# Patient Record
Sex: FEMALE | Race: WHITE | Employment: UNEMPLOYED | URBAN - METROPOLITAN AREA
[De-identification: names, ages, dates, MRNs, and addresses within clinical notes are randomized per-mention and may not be internally consistent; named-entity substitution may affect disease eponyms.]

---

## 2017-05-05 PROBLEM — M85.88 OSTEOPENIA OF SPINE: Status: ACTIVE | Noted: 2017-05-05

## 2018-01-07 ENCOUNTER — HOSPITAL ENCOUNTER (OUTPATIENT)
Age: 58
Discharge: HOME | End: 2018-01-07
Payer: COMMERCIAL

## 2018-01-07 ENCOUNTER — APPOINTMENT (OUTPATIENT)
Dept: GENERAL RADIOLOGY | Age: 58
End: 2018-01-07
Attending: PHYSICIAN ASSISTANT
Payer: COMMERCIAL

## 2018-01-07 VITALS
OXYGEN SATURATION: 100 % | TEMPERATURE: 98 F | WEIGHT: 145 LBS | HEIGHT: 67.5 IN | RESPIRATION RATE: 16 BRPM | DIASTOLIC BLOOD PRESSURE: 91 MMHG | BODY MASS INDEX: 22.49 KG/M2 | HEART RATE: 79 BPM | SYSTOLIC BLOOD PRESSURE: 134 MMHG

## 2018-01-07 DIAGNOSIS — M20.012 MALLET DEFORMITY OF LEFT RING FINGER: Primary | ICD-10-CM

## 2018-01-07 PROCEDURE — 99213 OFFICE O/P EST LOW 20 MIN: CPT

## 2018-01-07 PROCEDURE — 26720 TREAT FINGER FRACTURE EACH: CPT

## 2018-01-07 PROCEDURE — 73140 X-RAY EXAM OF FINGER(S): CPT | Performed by: PHYSICIAN ASSISTANT

## 2018-01-07 PROCEDURE — 99212 OFFICE O/P EST SF 10 MIN: CPT

## 2018-01-07 NOTE — ED PROVIDER NOTES
Patient Seen in: [address]    History   Patient presents with:  Upper Extremity Injury (musculoskeletal)    Stated Complaint: L 4th Finger Injury    HPI    Patient is a 82-year-old female who presents for evaluation of l Review of Systems   Constitutional: Negative. Respiratory: Negative. Cardiovascular: Negative. Musculoskeletal:        L hand 4th digit injury        Positive for stated complaint: Finger Injury  Other systems are as noted in HPI.   Constitutional VS, pt appears nontoxic. PE as above. XR L 4th digit obtained -revealed small displaced avulsion fracture of the base of the middle phalanx however age is indeterminate. Patient is not tender at all over this area, no swelling or ecchymosis.   Patient with

## 2018-01-09 PROBLEM — M79.645 FINGER PAIN, LEFT: Status: ACTIVE | Noted: 2018-01-09

## 2018-02-16 PROBLEM — M20.012 MALLET FINGER OF LEFT HAND: Status: ACTIVE | Noted: 2018-02-16

## 2018-08-31 PROCEDURE — 87186 SC STD MICRODIL/AGAR DIL: CPT | Performed by: INTERNAL MEDICINE

## 2018-08-31 PROCEDURE — 87086 URINE CULTURE/COLONY COUNT: CPT | Performed by: INTERNAL MEDICINE

## 2018-08-31 PROCEDURE — 87088 URINE BACTERIA CULTURE: CPT | Performed by: INTERNAL MEDICINE

## 2018-08-31 PROCEDURE — 81001 URINALYSIS AUTO W/SCOPE: CPT | Performed by: INTERNAL MEDICINE

## 2018-09-17 ENCOUNTER — OFFICE VISIT (OUTPATIENT)
Dept: OPHTHALMOLOGY | Facility: CLINIC | Age: 58
End: 2018-09-17
Payer: COMMERCIAL

## 2018-09-17 DIAGNOSIS — H52.203 MYOPIA OF BOTH EYES WITH ASTIGMATISM AND PRESBYOPIA: ICD-10-CM

## 2018-09-17 DIAGNOSIS — H52.4 MYOPIA OF BOTH EYES WITH ASTIGMATISM AND PRESBYOPIA: ICD-10-CM

## 2018-09-17 DIAGNOSIS — H52.13 MYOPIA OF BOTH EYES WITH ASTIGMATISM AND PRESBYOPIA: ICD-10-CM

## 2018-09-17 DIAGNOSIS — H50.52 EXOPHORIA: Primary | ICD-10-CM

## 2018-09-17 DIAGNOSIS — Z86.69 HISTORY OF IRITIS: ICD-10-CM

## 2018-09-17 PROCEDURE — 92014 COMPRE OPH EXAM EST PT 1/>: CPT | Performed by: OPHTHALMOLOGY

## 2018-09-17 PROCEDURE — 92015 DETERMINE REFRACTIVE STATE: CPT | Performed by: OPHTHALMOLOGY

## 2018-09-17 NOTE — PROGRESS NOTES
George Luna is a 66year old female. HPI:     HPI     EP/ 66 yr old here for a complete exam. LDE 4/4/16 with Hx of myopia, astigmatism, presbyopia and exophoria.  Pt complains of blurred vision OU at distance and near with her glasses and would [facility] 1.00        Years: 10.00        Pack years: 10        Types: Cigarettes        Quit date: 1996        Years since quittin.7      Smokeless tobacco: Never Used    Alcohol use:  Yes      Alcohol/week: 1.5 oz      Types: 3 Glasses of wine per week Type:  Distance only            Contact Lens Exam     Current Contact Lens Rx       Brand Base Curve Diameter Sphere    Right Acuvue 2 8.30 14.0 -2.25    Left Acuvue 2 8.30 14.0 -1.00          Final Contact Lens Rx       Brand Base Curve Diameter Sphere

## 2018-09-17 NOTE — PATIENT INSTRUCTIONS
Exophoria  convergence exercises    Myopia of both eyes with astigmatism and presbyopia  New glasses and contacts    History of iritis  Last episode or iritis right eye in 2016.  Work up negative

## 2019-09-09 PROBLEM — S52.124A CLOSED NONDISPLACED FRACTURE OF HEAD OF RIGHT RADIUS, INITIAL ENCOUNTER: Status: ACTIVE | Noted: 2019-09-09

## 2019-09-26 PROCEDURE — 88175 CYTOPATH C/V AUTO FLUID REDO: CPT | Performed by: INTERNAL MEDICINE

## 2019-09-26 PROCEDURE — 87624 HPV HI-RISK TYP POOLED RSLT: CPT | Performed by: INTERNAL MEDICINE

## 2020-04-11 ENCOUNTER — HOSPITAL ENCOUNTER (EMERGENCY)
Facility: HOSPITAL | Age: 60
Discharge: HOME | End: 2020-04-12
Attending: EMERGENCY MEDICINE
Payer: COMMERCIAL

## 2020-04-11 VITALS
RESPIRATION RATE: 18 BRPM | OXYGEN SATURATION: 96 % | BODY MASS INDEX: 23.49 KG/M2 | HEIGHT: 68 IN | SYSTOLIC BLOOD PRESSURE: 146 MMHG | WEIGHT: 155 LBS | DIASTOLIC BLOOD PRESSURE: 95 MMHG | HEART RATE: 80 BPM | TEMPERATURE: 98 F

## 2020-04-11 DIAGNOSIS — B02.9 HERPES ZOSTER WITHOUT COMPLICATION: Primary | ICD-10-CM
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
## 2020-04-11 PROCEDURE — 99283 EMERGENCY DEPT VISIT LOW MDM: CPT

## 2020-04-11 RX ORDER — TETRACAINE HYDROCHLORIDE 5 MG/ML
1 SOLUTION OPHTHALMIC ONCE
Status: COMPLETED | OUTPATIENT
Start: 2020-04-11 | End: 2020-04-12

## 2020-04-12 RX ORDER — PREDNISONE 20 MG/1
40 TABLET ORAL DAILY
Qty: 8 TABLET | Refills: 0 | Status: SHIPPED | OUTPATIENT
Start: 2020-04-12 | End: 2020-04-16

## 2020-04-12 RX ORDER — HYDROXYZINE HYDROCHLORIDE 25 MG/1
25 TABLET, FILM COATED ORAL EVERY 6 HOURS PRN
Qty: 20 TABLET | Refills: 0 | Status: SHIPPED | OUTPATIENT
Start: 2020-04-12 | End: 2020-04-17

## 2020-04-12 RX ORDER — PREDNISONE 20 MG/1
40 TABLET ORAL ONCE
Status: COMPLETED | OUTPATIENT
Start: 2020-04-12 | End: 2020-04-12

## 2020-04-12 NOTE — ED PROVIDER NOTES
Patient Seen in: [facility] AND [facility] Emergency Department    History   Patient presents with:  Shingles    Stated Complaint: Dx with shingles, now near eye     HPI    17-year-old female with past medical history of dyslipidemia, migraines presenting for eval age at dx 69   • Glaucoma Neg         family h/o   • Macular degeneration Neg         family h/o   • Eye Problems Neg         family h/o retina       Social History    Tobacco Use      Smoking status: Former Smoker        Packs/day: 1.00        Years: concerning for possible bedbug bites without cellulitic change. Psychiatric: Cooperative. Nursing note and vitals reviewed.         ED Course   Labs Reviewed - No data to display         MDM     DIFFERENTIAL DIAGNOSIS: After history and physical exam diff tablet, R-0    predniSONE 20 MG Oral Tab  Take 2 tablets (40 mg total) by mouth daily for 4 days. , Print, Disp-8 tablet, R-0

## 2020-04-12 NOTE — ED INITIAL ASSESSMENT (HPI)
Pt states she was dx with shingles to right shoulder yesterday. Started on antiviral. Pt now states spreading to right eye brow and vagina. No changes to vision. Took Advil 400mg at 2300. Mask applied to patient in triage.

## 2020-10-09 ENCOUNTER — OFFICE VISIT (OUTPATIENT)
Dept: OPHTHALMOLOGY | Facility: CLINIC | Age: 60
End: 2020-10-09
Payer: COMMERCIAL

## 2020-10-09 DIAGNOSIS — H52.4 MYOPIA OF BOTH EYES WITH ASTIGMATISM AND PRESBYOPIA: ICD-10-CM

## 2020-10-09 DIAGNOSIS — H01.006 BLEPHARITIS OF BOTH EYES, UNSPECIFIED EYELID, UNSPECIFIED TYPE: Primary | ICD-10-CM

## 2020-10-09 DIAGNOSIS — H52.203 MYOPIA OF BOTH EYES WITH ASTIGMATISM AND PRESBYOPIA: ICD-10-CM

## 2020-10-09 DIAGNOSIS — H10.13 ALLERGIC CONJUNCTIVITIS OF BOTH EYES: ICD-10-CM

## 2020-10-09 DIAGNOSIS — H52.13 MYOPIA OF BOTH EYES WITH ASTIGMATISM AND PRESBYOPIA: ICD-10-CM

## 2020-10-09 DIAGNOSIS — H50.52 EXOPHORIA: ICD-10-CM

## 2020-10-09 DIAGNOSIS — H01.003 BLEPHARITIS OF BOTH EYES, UNSPECIFIED EYELID, UNSPECIFIED TYPE: Primary | ICD-10-CM

## 2020-10-09 PROCEDURE — 92014 COMPRE OPH EXAM EST PT 1/>: CPT | Performed by: OPHTHALMOLOGY

## 2020-10-09 PROCEDURE — 92015 DETERMINE REFRACTIVE STATE: CPT | Performed by: OPHTHALMOLOGY

## 2020-10-09 NOTE — PATIENT INSTRUCTIONS
Blepharitis of both eyes  Patient instructed to use lid hygiene daily. Apply baby shampoo to warm washcloth and scrub eyelids gently with eyes closed, then rinse thoroughly. Allergic conjunctivitis of both eyes  Try otc Pataday as needed.     Exopho

## 2020-10-09 NOTE — PROGRESS NOTES
Carlos Choi is a 64year old female. HPI:     HPI     EP/ 64 yr old here for a complete exam. LDE 9/17/18 with Hx of myopia, astigmatism, presbyopia and exophoria. Pt did not update glasses and CL's from last visit, she is out of CL's.  Pt is inter Neg         family h/o   • Eye Problems Neg         family h/o retina       Social History: Social History    Tobacco Use      Smoking status: Former Smoker        Packs/day: 1.00        Years: 10.00        Pack years: 10        Types: Cigarettes        Pari Brayn 0.2    Macula Normal Normal    Vessels Normal Normal    Periphery Normal Normal            Refraction     Wearing Rx       Sphere Cylinder Axis    Right -3.50 +0.75 025    Left -2.00 +1.00 135    Type: OLD Distance only          Manifest Refraction       S Nikky Horan MD

## 2020-10-22 PROBLEM — S52.124A CLOSED NONDISPLACED FRACTURE OF HEAD OF RIGHT RADIUS, INITIAL ENCOUNTER: Status: RESOLVED | Noted: 2019-09-09 | Resolved: 2020-10-22

## 2021-03-24 ENCOUNTER — IMMUNIZATION (OUTPATIENT)
Dept: LAB | Age: 61
End: 2021-03-24

## 2021-03-24 DIAGNOSIS — Z23 NEED FOR VACCINATION: Primary | ICD-10-CM

## 2021-03-24 PROCEDURE — 91300 COVID 19 PFIZER-BIONTECH: CPT

## 2021-03-24 PROCEDURE — 0001A COVID 19 PFIZER-BIONTECH: CPT

## 2021-04-14 ENCOUNTER — IMMUNIZATION (OUTPATIENT)
Dept: LAB | Age: 61
End: 2021-04-14

## 2021-04-14 DIAGNOSIS — Z23 NEED FOR VACCINATION: Primary | ICD-10-CM

## 2021-04-14 PROCEDURE — 0002A COVID 19 PFIZER-BIONTECH: CPT

## 2021-04-14 PROCEDURE — 91300 COVID 19 PFIZER-BIONTECH: CPT

## 2021-04-22 ENCOUNTER — OFFICE VISIT (OUTPATIENT)
Dept: OPHTHALMOLOGY | Facility: CLINIC | Age: 61
End: 2021-04-22
Payer: COMMERCIAL

## 2021-04-22 ENCOUNTER — TELEPHONE (OUTPATIENT)
Dept: OPHTHALMOLOGY | Facility: CLINIC | Age: 61
End: 2021-04-22

## 2021-04-22 DIAGNOSIS — H11.432 CONJUNCTIVAL HYPEREMIA, LEFT: Primary | ICD-10-CM

## 2021-04-22 PROCEDURE — 99213 OFFICE O/P EST LOW 20 MIN: CPT | Performed by: OPHTHALMOLOGY

## 2021-04-22 NOTE — PATIENT INSTRUCTIONS
Conjunctival hyperemia, left  Reassured patient that there is no active iritis or infection in the left eye. Patient will use Pataday as needed for the next few days and see if that helps.   Use artificial tears (any over the counter brand is okay) up to

## 2021-04-22 NOTE — TELEPHONE ENCOUNTER
Apt booked at 1:30 today. Left eye x 2 days, red, crusting, discharge/ pt is using Pataday- improved a little. Pt denies any FB sensation.

## 2021-04-22 NOTE — ASSESSMENT & PLAN NOTE
Reassured patient that there is no active iritis or infection in the left eye. Patient will use Pataday as needed for the next few days and see if that helps.   Use artificial tears (any over the counter brand is okay) up to 4 times per day as needed for

## 2021-04-22 NOTE — PROGRESS NOTES
Anushka Slaughter is a 64year old female. HPI:     HPI     Pt called today complaining of redness, crusting with discharge in her left since yesterday. Pt denies any blurred vision or light sensitivity. Pt is wearing glasses from 10/2020 with [facility].  Pt tr Social History    Tobacco Use      Smoking status: Former Smoker        Packs/day: 1.00        Years: 10.00        Pack years: 10        Types: Cigarettes        Quit date: 1996        Years since quittin.3      Smokeless tobacco: Never Used    [place] brand is okay) up to 4 times per day as needed for dry eye symptoms. Patient should call office and make return appointment if symptoms worsen or do not completely resolve. No orders of the defined types were placed in this encounter.       Meds

## 2021-06-30 ENCOUNTER — TELEPHONE (OUTPATIENT)
Dept: OPHTHALMOLOGY | Facility: CLINIC | Age: 61
End: 2021-06-30

## 2021-06-30 NOTE — TELEPHONE ENCOUNTER
In My Chart to find your glasses or contact prescription, log on and find tab that says Menu.  (3 horizontal lines on the top left)  Scroll down to the HipFlat" section.  (it is the 3rd category down)   For glasses it will list \"Eyeglass Prescrip

## 2023-10-03 ENCOUNTER — TELEPHONE (OUTPATIENT)
Dept: OPHTHALMOLOGY | Facility: CLINIC | Age: 63
End: 2023-10-03

## 2023-10-03 NOTE — TELEPHONE ENCOUNTER
Problem with right eye for a few weeks with redness and \"raw\" feeling. She also says vision is blurry OD. She has tried artificial tears which do not seem to help. She has also tried Pataday. Appointment was scheduled on 10/4/23 at 4:30. Patient was advised he may have a long wait due to very busy schedule and being added on. Patient expressed understanding.

## 2023-10-04 ENCOUNTER — OFFICE VISIT (OUTPATIENT)
Dept: OPHTHALMOLOGY | Facility: CLINIC | Age: 63
End: 2023-10-04

## 2023-10-04 DIAGNOSIS — H57.89 IRRITATION OF RIGHT EYE: Primary | ICD-10-CM

## 2023-10-04 PROCEDURE — 99213 OFFICE O/P EST LOW 20 MIN: CPT | Performed by: OPHTHALMOLOGY

## 2023-10-04 NOTE — ASSESSMENT & PLAN NOTE
No cause found for redness and irritation in the right eye. No sign of iritis.       Will see patient for a complete exam on 11/28/23

## 2023-10-04 NOTE — PROGRESS NOTES
Christina Boo is a 66year old female. HPI:     HPI    Pt in today for a redness and irritation evaluation in the right eye (is feeling better today) . Pt complains of redness and irritation in the right eye that has been present for about 2-3 weeks and has tried artificial tears and Pataday. which don't really help. Pt also complains of blurry vision in the right eye. Last edited by Yong Ramirez OT on 10/4/2023  4:34 PM.        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     Family History   Problem Relation Age of Onset    Hypertension Father     Diabetes Father         type 2    Obesity Father     Hypertension Mother     Other (celiac disease) Daughter 3    Other (JRA) Daughter 3 yo    Cancer Sister 4        leukemia    Lipids Brother     Other (viral pericarditis) Brother 15    Lipids Sister     Lipids Sister     Lipids Brother     Lipids Brother     Breast Cancer Maternal Grandmother 67        age at dx 69    Glaucoma Neg         family h/o    Macular degeneration Neg         family h/o    Eye Problems Neg         family h/o retina       Social History:   Social History     Socioeconomic History    Marital status:  Occupational History    Occupation: homemaker   Tobacco Use    Smoking status: Former     Packs/day: 1.00     Years: 10.00     Additional pack years: 0.00     Total pack years: 10.00     Types: Cigarettes     Quit date: 1996     Years since quittin.7    Smokeless tobacco: Never   Vaping Use    Vaping Use: Never used   Substance and Sexual Activity    Alcohol use: Yes     Alcohol/week: 2.5 standard drinks of alcohol     Types: 3 Glasses of wine per week    Drug use: No    Sexual activity: Yes     Partners: Male     Comment:    Other Topics Concern     Service No    Blood Transfusions No    Caffeine Concern Yes     Comment: 3 cup day/coffee    Occupational Exposure No    Hobby Hazards No    Sleep Concern No    Stress Concern No    Weight Concern No    Special Diet No    Back Care No    Exercise Yes     Comment: inconsistent walking 5 miles 3-4x/week    Bike Helmet Yes    Seat Belt Yes    Self-Exams Yes       Medications:  Current Outpatient Medications   Medication Sig Dispense Refill    levothyroxine 88 MCG Oral Tab Take 1 tablet (88 mcg total) by mouth daily. 90 tablet 3    lisinopril-hydroCHLOROthiazide 20-12.5 MG Oral Tab Take 1 tablet by mouth daily.  90 tablet 3       Allergies:  No Known Allergies    ROS:       PHYSICAL EXAM:     Base Eye Exam       Visual Acuity (Snellen - Linear)         Right Left    Dist cc 20/20 20/20 -3      Correction: Glasses              Pupils         Pupils    Right PERRL    Left PERRL                  Slit Lamp and Fundus Exam       Slit Lamp Exam         Right Left    Lids/Lashes Dermatochalasis, Meibomian gland dysfunction Dermatochalasis, Meibomian gland dysfunction    Conjunctiva/Sclera non-injected non-injected    Cornea Clear Clear    Anterior Chamber Deep and quiet Deep and quiet    Iris Normal Normal                  Refraction       Wearing Rx         Sphere Cylinder Axis    Right -2.00 +0.50 025    Left -1.25 +1.00 135      Type: Distance only ASSESSMENT/PLAN:     Diagnoses and Plan:     Irritation of right eye  No cause found for redness and irritation in the right eye. No sign of iritis. Will see patient for a complete exam on 11/28/23     No orders of the defined types were placed in this encounter.       Meds This Visit:  Requested Prescriptions      No prescriptions requested or ordered in this encounter        Follow up instructions:  Return in about 1 year (around 10/4/2024) for complete exam.    10/4/2023  Scribed by: Jessica De Guzman MD

## 2023-10-04 NOTE — PATIENT INSTRUCTIONS
Irritation of right eye  No cause found for redness and irritation in the right eye. No sign of iritis.       Will see patient for a complete exam on 11/28/23

## 2023-11-16 ENCOUNTER — OFFICE VISIT (OUTPATIENT)
Dept: OPHTHALMOLOGY | Facility: CLINIC | Age: 63
End: 2023-11-16

## 2023-11-16 DIAGNOSIS — H43.393 VITREOUS FLOATERS OF BOTH EYES: ICD-10-CM

## 2023-11-16 DIAGNOSIS — H25.13 AGE-RELATED NUCLEAR CATARACT OF BOTH EYES: Primary | ICD-10-CM

## 2023-11-16 PROCEDURE — 92015 DETERMINE REFRACTIVE STATE: CPT | Performed by: OPHTHALMOLOGY

## 2023-11-16 PROCEDURE — 92014 COMPRE OPH EXAM EST PT 1/>: CPT | Performed by: OPHTHALMOLOGY

## 2023-11-16 NOTE — PATIENT INSTRUCTIONS
Age-related nuclear cataract of both eyes  Discussed early cataracts with patient. No treatment recommended at this time. New glasses Rx given today, update as needed    Vitreous floaters of both eyes   There is no evidence of retinal pathology. All signs and symptoms of retinal detachment/tears explained in detail. Patient instructed to call the office if they experience increase in floaters, increase in flashes of light, loss of vision or curtain or veil effect.

## 2023-11-16 NOTE — PROGRESS NOTES
Srinivas Harrell is a 66year old female. HPI:     HPI    Pt in today for a complete eye exam. Pt states vision is stable with her current glasses but wants an updated glasses Rx. Pt denies any further issues with redness and irritation in the eyes. Last edited by Whit Jalloh OT on 2023  1:38 PM.        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     Family History   Problem Relation Age of Onset    Hypertension Father     Diabetes Father         type 2    Obesity Father     Hypertension Mother     Other (celiac disease) Daughter 3    Other (JRA) Daughter 3 yo    Cancer Sister 4        leukemia    Lipids Brother     Other (viral pericarditis) Brother 15    Lipids Sister     Lipids Sister     Lipids Brother     Lipids Brother     Breast Cancer Maternal Grandmother 67        age at dx 69    Glaucoma Neg         family h/o    Macular degeneration Neg         family h/o    Eye Problems Neg         family h/o retina       Social History:   Social History     Socioeconomic History    Marital status:    Occupational History    Occupation: homemaker   Tobacco Use    Smoking status: Former     Packs/day: 1.00     Years: 10.00 Additional pack years: 0.00     Total pack years: 10.00     Types: Cigarettes     Quit date: 1996     Years since quittin.8    Smokeless tobacco: Never   Vaping Use    Vaping Use: Never used   Substance and Sexual Activity    Alcohol use: Yes     Alcohol/week: 2.5 standard drinks of alcohol     Types: 3 Glasses of wine per week    Drug use: No    Sexual activity: Yes     Partners: Male     Comment:    Other Topics Concern     Service No    Blood Transfusions No    Caffeine Concern Yes     Comment: 3 cup day/coffee    Occupational Exposure No    Hobby Hazards No    Sleep Concern No    Stress Concern No    Weight Concern No    Special Diet No    Back Care No    Exercise Yes     Comment: inconsistent walking 5 miles 3-4x/week    Bike Helmet Yes    Seat Belt Yes    Self-Exams Yes       Medications:  Current Outpatient Medications   Medication Sig Dispense Refill    levothyroxine 88 MCG Oral Tab Take 1 tablet (88 mcg total) by mouth daily. 90 tablet 3    lisinopril-hydroCHLOROthiazide 20-12.5 MG Oral Tab Take 1 tablet by mouth daily.  90 tablet 3       Allergies:  No Known Allergies    ROS:       PHYSICAL EXAM:     Base Eye Exam       Visual Acuity (Snellen - Linear)         Right Left    Dist cc 20/20 -2 20/25 +2    Near sc 20/25 20/25-      Correction: Glasses              Tonometry ([facility], 1:44 PM)         Right Left    Pressure 9 9              Pupils         Pupils    Right PERRL    Left PERRL              Visual Fields         Left Right     Full Full              Extraocular Movement         Right Left     Full, Ortho Full, Ortho              Dilation       Both eyes: 1.0% Mydriacyl and 2.5% Fito Synephrine @ 1:44 PM                  Slit Lamp and Fundus Exam       External Exam         Right Left    External Normal Normal              Slit Lamp Exam         Right Left    Lids/Lashes Dermatochalasis, Meibomian gland dysfunction Dermatochalasis, Meibomian gland dysfunction Conjunctiva/Sclera Normal Normal    Cornea Clear Clear    Anterior Chamber Deep and quiet Deep and quiet    Iris Normal Normal    Lens 1+ Nuclear sclerosis 1+ Nuclear sclerosis    Vitreous Vitreous floaters Vitreous floaters              Fundus Exam         Right Left    Disc Good rim, Temporal crescent Good rim, Temporal crescent    C/D Ratio 0.1 0.1    Macula Normal Normal    Vessels Normal Normal    Periphery Normal Normal                  Refraction       Wearing Rx         Sphere Cylinder Axis    Right -2.00 +0.50 025    Left -1.25 +1.00 135      Type: Distance only              Manifest Refraction         Sphere Cylinder [place] Add [place]    Right -2.25 +0.75 025 20/20 +2.00 20/20    Left -1.50 +1.25 135 20/20 +2.00 20/20              Final Rx         Sphere Cylinder [place]    Right -2.25 +0.75 025 20/20    Left -1.50 +1.25 135 20/20      Type: Distance only                     ASSESSMENT/PLAN:     Diagnoses and Plan:     Age-related nuclear cataract of both eyes  Discussed early cataracts with patient. No treatment recommended at this time. New glasses Rx given today, update as needed    Vitreous floaters of both eyes   There is no evidence of retinal pathology. All signs and symptoms of retinal detachment/tears explained in detail. Patient instructed to call the office if they experience increase in floaters, increase in flashes of light, loss of vision or curtain or veil effect. No orders of the defined types were placed in this encounter.       Meds This Visit:  Requested Prescriptions      No prescriptions requested or ordered in this encounter        Follow up instructions:  Return in about 2 years (around 11/16/2025) for complete exam.    11/16/2023  Scribed by: Mally Mcpherson MD

## 2023-12-14 ENCOUNTER — HOSPITAL ENCOUNTER (OUTPATIENT)
Age: 63
Discharge: HOME | End: 2023-12-14
Payer: COMMERCIAL

## 2023-12-14 VITALS
SYSTOLIC BLOOD PRESSURE: 131 MMHG | OXYGEN SATURATION: 99 % | DIASTOLIC BLOOD PRESSURE: 93 MMHG | RESPIRATION RATE: 18 BRPM | TEMPERATURE: 97 F | HEART RATE: 68 BPM

## 2023-12-14 DIAGNOSIS — N30.00 ACUTE CYSTITIS WITHOUT HEMATURIA: Primary | ICD-10-CM

## 2023-12-14 LAB
BILIRUB UR QL STRIP: NEGATIVE
CLARITY UR: CLEAR
COLOR UR: YELLOW
GLUCOSE UR STRIP-MCNC: NEGATIVE MG/DL
HGB UR QL STRIP: NEGATIVE
KETONES UR STRIP-MCNC: NEGATIVE MG/DL
NITRITE UR QL STRIP: NEGATIVE
PH UR STRIP: 7 [PH]
PROT UR STRIP-MCNC: NEGATIVE MG/DL
SP GR UR STRIP: <=1.005
UROBILINOGEN UR STRIP-ACNC: <2 MG/DL

## 2023-12-14 PROCEDURE — 99204 OFFICE O/P NEW MOD 45 MIN: CPT

## 2023-12-14 PROCEDURE — 99213 OFFICE O/P EST LOW 20 MIN: CPT

## 2023-12-14 PROCEDURE — 81002 URINALYSIS NONAUTO W/O SCOPE: CPT

## 2023-12-14 RX ORDER — NITROFURANTOIN 25; 75 MG/1; MG/1
100 CAPSULE ORAL 2 TIMES DAILY
Qty: 14 CAPSULE | Refills: 0 | Status: SHIPPED | OUTPATIENT
Start: 2023-12-14 | End: 2023-12-21

## 2023-12-14 RX ORDER — PHENAZOPYRIDINE HYDROCHLORIDE 200 MG/1
200 TABLET, FILM COATED ORAL 3 TIMES DAILY PRN
Qty: 6 TABLET | Refills: 0 | Status: SHIPPED | OUTPATIENT
Start: 2023-12-14 | End: 2023-12-21

## 2023-12-14 NOTE — DISCHARGE INSTRUCTIONS
Take Pyridium as needed for urgency and frequency. Increase water intake. Take Macrobid twice a day. Urine culture is pending and results will be available in 48 hours.   Follow-up with your primary doctor

## 2024-11-26 ENCOUNTER — HOSPITAL ENCOUNTER (OUTPATIENT)
Age: 64
Discharge: HOME | End: 2024-11-26
Attending: STUDENT IN AN ORGANIZED HEALTH CARE EDUCATION/TRAINING PROGRAM
Payer: COMMERCIAL

## 2024-11-26 VITALS
DIASTOLIC BLOOD PRESSURE: 82 MMHG | TEMPERATURE: 98 F | OXYGEN SATURATION: 97 % | SYSTOLIC BLOOD PRESSURE: 115 MMHG | RESPIRATION RATE: 16 BRPM | HEART RATE: 89 BPM

## 2024-11-26 DIAGNOSIS — S69.92XA INJURY OF FINGER OF LEFT HAND, INITIAL ENCOUNTER: Primary | ICD-10-CM

## 2024-11-26 PROCEDURE — 99212 OFFICE O/P EST SF 10 MIN: CPT

## 2024-11-26 PROCEDURE — 99211 OFF/OP EST MAY X REQ PHY/QHP: CPT

## 2024-11-26 NOTE — DISCHARGE INSTRUCTIONS
At this time, there is no sign of infection of the fingernail, or sign of nailbed injury underneath the fingernail, but if you do develop any signs or symptoms of infection with redness, purulence, swelling, pain, or fevers, I recommend immediate reassessment.  If there is swelling and redness all down the finger, you cannot move the finger, pain is severe, I recommend immediate assessment in the emergency department.  I recommend rest, elevation when at rest, and application of ice.

## 2024-11-26 NOTE — ED INITIAL ASSESSMENT (HPI)
Patient arrives ambulatory with c/o left 3rd fingernail bending back approx 2 weeks ago. Also reports bruise underneath finger from a garage door 3 months ago.

## 2024-11-26 NOTE — ED PROVIDER NOTES
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  Patient reports that on 2024, which was over 2 months ago, she injured the finger in a car door, but only had a small bruise of the left nailbed and was never assessed given symptoms resolved and she had no concern for fracture.  She states that 2 weeks ago while reaching under a counter, her long finger nail hit the wall and bent backwards, no bleeding or purulent drainage, today, she bumped the long finger nail again, still no bleeding or purulence or significant pain, she states she can move the finger without pain.  She trimmed the nail back so is different further injury, but wanted to have the finger assessed.    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    Subjective visual disturbance              Past Surgical History:   Procedure Laterality Date    Leep      cervical dysplasia          Other surgical history      bone cyst-bone marrow used from hip    Tonsillectomy                  Social History     Socioeconomic History    Marital status:    Occupational History    Occupation: homemaker   Tobacco Use    Smoking status: Former     Current packs/day: 0.00     Average packs/day: 1 pack/day for 10.0 years (10.0 ttl  pk-yrs)     Types: Cigarettes     Start date: 1986     Quit date: 1996     Years since quittin.9    Smokeless tobacco: Never   Vaping Use    Vaping status: Never Used   Substance and Sexual Activity    Alcohol use: Yes     Alcohol/week: 2.5 standard drinks of alcohol     Types: 3 Glasses of wine per week    Drug use: No    Sexual activity: Yes     Partners: Male     Comment:    Other Topics Concern     Service No    Blood Transfusions No    Caffeine Concern Yes     Comment: 3 cup day/coffee    Occupational Exposure No    Hobby Hazards No    Sleep Concern No    Stress Concern No    Weight Concern No    Special Diet No    Back Care No    Exercise Yes     Comment: inconsistent walking 5 miles 3-4x/week    Bike Helmet Yes    Seat Belt Yes    Self-Exams Yes              Review of Systems    Positive for stated complaint: Left Hand Injury  Other systems are as noted in HPI.  Constitutional and vital signs reviewed.      All other systems reviewed and negative except as noted above.    Physical Exam     ED Triage Vitals [24 1600]   /82   Pulse 89   Resp 16   Temp 98.4 °F (36.9 °C)   Temp src Temporal   SpO2 97 %   O2 Device None (Room air)       Current Vitals:   Vital Signs  BP: 115/82  Pulse: 89  Resp: 16  Temp: 98.4 °F (36.9 °C)  Temp src: Temporal    Oxygen Therapy  SpO2: 97 %  O2 Device: None (Room air)        Physical Exam    General: Awake, alert, comfortable on room air, in no distress, tolerating oral secretions, interactive  Pulmonary: No conversational dyspnea  Cardiac: +2 B/L regular radial pulses  Neuro: Symmetrical facial expressions on gross observation, normal speech  Musculoskeletal: Patient with 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, left third fingernail is intact, no surrounding paronychia, no surrounding erythema or purulence or fluctuance or edema, intact active flexion and extension of the left third MCP/PIP/DIP joint, very small bruise under the  middle of the left third nail with very mild TTP, no nail laceration, no bleeding, skin is intact  HEENT: No periorbital edema or erythema  Skin: No erythema or warmth or fluctuance or purulence of the left third nail folds or eponychium or finger  Psych: Normal mood, normal affect    ED Course   No labs or imaging performed  MDM   At this time, there is only a very small bruise of the left third fingernail which patient reports occurred over 2 months ago, given this is not acute and is not large no indication for trephination today, there is no sign of flexor tenosynovitis, no sign of surrounding cellulitis, no sign of paronychia, no sign of felon, no sign of acute nailbed laceration, no avulsion of the fingernail, no indication for fingernail removal, intact flexion and extension of all the joints of the left third finger with no sign of jersey finger or mallet finger of the left third finger, there is chronic left fourth mallet finger for which she did previously follow-up with a specialist for which there was no surgical intervention  -We discussed x-ray imaging given blunt trauma that occurred over 2 months ago, to ensure no underlying fracture, patient declines  -Patient has already trimmed the nail to a normal length so it is less susceptible to injury  -Patient given hand specialist information for follow-up to ensure the nail continues to heal appropriately, although as we discussed at this time, no indication for antibiotics with no sign of infection and no obvious sign of acute trauma or laceration to indicate a repair at this time  -We did discuss that infections can develop and therefore with any development of redness, fluctuance, purulence, pain I did recommend immediate reassessment        Disposition and Plan     Clinical Impression:  1. Injury of finger of left hand, initial encounter         Disposition:  Discharge  11/26/2024  4:30 pm    Follow-up:  Luis Armando Bella MD  [address]  [facility] [id number] [phone]    Schedule an appointment as soon as possible for a visit   hand specialist    Shyam George MD  [address] [facility] [address]  [facility] [address] [phone]      hand specialist    Catracho Larson MD  [address] [facility] [address]  [place] [address] [phone]      Ortho          Medications Prescribed:  Discharge Medication List as of 11/26/2024  4:31 PM            None